# Patient Record
Sex: FEMALE | Race: ASIAN | NOT HISPANIC OR LATINO
[De-identification: names, ages, dates, MRNs, and addresses within clinical notes are randomized per-mention and may not be internally consistent; named-entity substitution may affect disease eponyms.]

---

## 2017-10-18 ENCOUNTER — TRANSCRIPTION ENCOUNTER (OUTPATIENT)
Age: 32
End: 2017-10-18

## 2018-02-21 ENCOUNTER — TRANSCRIPTION ENCOUNTER (OUTPATIENT)
Age: 33
End: 2018-02-21

## 2018-08-18 ENCOUNTER — TRANSCRIPTION ENCOUNTER (OUTPATIENT)
Age: 33
End: 2018-08-18

## 2018-10-27 ENCOUNTER — INPATIENT (INPATIENT)
Facility: HOSPITAL | Age: 33
LOS: 0 days | Discharge: HOME | DRG: 552 | End: 2018-10-28
Attending: NEUROLOGICAL SURGERY | Admitting: NEUROLOGICAL SURGERY
Payer: COMMERCIAL

## 2018-10-27 ENCOUNTER — TRANSCRIPTION ENCOUNTER (OUTPATIENT)
Age: 33
End: 2018-10-27

## 2018-10-27 VITALS
DIASTOLIC BLOOD PRESSURE: 65 MMHG | SYSTOLIC BLOOD PRESSURE: 99 MMHG | HEART RATE: 70 BPM | WEIGHT: 147.93 LBS | OXYGEN SATURATION: 99 % | HEIGHT: 64 IN | TEMPERATURE: 98 F | RESPIRATION RATE: 16 BRPM

## 2018-10-27 DIAGNOSIS — M54.30 SCIATICA, UNSPECIFIED SIDE: ICD-10-CM

## 2018-10-27 LAB
ALBUMIN SERPL ELPH-MCNC: 4.1 G/DL — SIGNIFICANT CHANGE UP (ref 3.3–5)
ALP SERPL-CCNC: 66 U/L — SIGNIFICANT CHANGE UP (ref 40–120)
ALT FLD-CCNC: 8 U/L — LOW (ref 10–45)
ANION GAP SERPL CALC-SCNC: 13 MMOL/L — SIGNIFICANT CHANGE UP (ref 5–17)
AST SERPL-CCNC: 8 U/L — LOW (ref 10–40)
BILIRUB SERPL-MCNC: 0.9 MG/DL — SIGNIFICANT CHANGE UP (ref 0.2–1.2)
BUN SERPL-MCNC: 19 MG/DL — SIGNIFICANT CHANGE UP (ref 7–23)
CALCIUM SERPL-MCNC: 9.5 MG/DL — SIGNIFICANT CHANGE UP (ref 8.4–10.5)
CHLORIDE SERPL-SCNC: 108 MMOL/L — SIGNIFICANT CHANGE UP (ref 96–108)
CO2 SERPL-SCNC: 19 MMOL/L — LOW (ref 22–31)
CREAT SERPL-MCNC: 0.63 MG/DL — SIGNIFICANT CHANGE UP (ref 0.5–1.3)
GLUCOSE SERPL-MCNC: 98 MG/DL — SIGNIFICANT CHANGE UP (ref 70–99)
HCT VFR BLD CALC: 39.9 % — SIGNIFICANT CHANGE UP (ref 34.5–45)
HGB BLD-MCNC: 12.9 G/DL — SIGNIFICANT CHANGE UP (ref 11.5–15.5)
MCHC RBC-ENTMCNC: 27.8 PG — SIGNIFICANT CHANGE UP (ref 27–34)
MCHC RBC-ENTMCNC: 32.3 GM/DL — SIGNIFICANT CHANGE UP (ref 32–36)
MCV RBC AUTO: 86.1 FL — SIGNIFICANT CHANGE UP (ref 80–100)
PLATELET # BLD AUTO: 251 K/UL — SIGNIFICANT CHANGE UP (ref 150–400)
POTASSIUM SERPL-MCNC: 4.1 MMOL/L — SIGNIFICANT CHANGE UP (ref 3.5–5.3)
POTASSIUM SERPL-SCNC: 4.1 MMOL/L — SIGNIFICANT CHANGE UP (ref 3.5–5.3)
PROT SERPL-MCNC: 7.5 G/DL — SIGNIFICANT CHANGE UP (ref 6–8.3)
RBC # BLD: 4.63 M/UL — SIGNIFICANT CHANGE UP (ref 3.8–5.2)
RBC # FLD: 13.1 % — SIGNIFICANT CHANGE UP (ref 10.3–14.5)
SODIUM SERPL-SCNC: 140 MMOL/L — SIGNIFICANT CHANGE UP (ref 135–145)
WBC # BLD: 21.3 K/UL — HIGH (ref 3.8–10.5)
WBC # FLD AUTO: 21.3 K/UL — HIGH (ref 3.8–10.5)

## 2018-10-27 PROCEDURE — 99284 EMERGENCY DEPT VISIT MOD MDM: CPT

## 2018-10-27 PROCEDURE — 72148 MRI LUMBAR SPINE W/O DYE: CPT | Mod: 26

## 2018-10-27 RX ORDER — SENNA PLUS 8.6 MG/1
2 TABLET ORAL AT BEDTIME
Refills: 0 | Status: DISCONTINUED | OUTPATIENT
Start: 2018-10-27 | End: 2018-10-28

## 2018-10-27 RX ORDER — OXYCODONE HYDROCHLORIDE 5 MG/1
10 TABLET ORAL EVERY 6 HOURS
Refills: 0 | Status: DISCONTINUED | OUTPATIENT
Start: 2018-10-27 | End: 2018-10-28

## 2018-10-27 RX ORDER — DOCUSATE SODIUM 100 MG
100 CAPSULE ORAL THREE TIMES A DAY
Refills: 0 | Status: DISCONTINUED | OUTPATIENT
Start: 2018-10-27 | End: 2018-10-28

## 2018-10-27 RX ORDER — LIDOCAINE 4 G/100G
1 CREAM TOPICAL ONCE
Refills: 0 | Status: COMPLETED | OUTPATIENT
Start: 2018-10-27 | End: 2018-10-27

## 2018-10-27 RX ORDER — OXYCODONE HYDROCHLORIDE 5 MG/1
5 TABLET ORAL EVERY 4 HOURS
Refills: 0 | Status: DISCONTINUED | OUTPATIENT
Start: 2018-10-27 | End: 2018-10-28

## 2018-10-27 RX ORDER — DEXAMETHASONE 0.5 MG/5ML
6 ELIXIR ORAL EVERY 8 HOURS
Refills: 0 | Status: DISCONTINUED | OUTPATIENT
Start: 2018-10-27 | End: 2018-10-28

## 2018-10-27 RX ORDER — ACETAMINOPHEN 500 MG
650 TABLET ORAL EVERY 6 HOURS
Refills: 0 | Status: DISCONTINUED | OUTPATIENT
Start: 2018-10-27 | End: 2018-10-28

## 2018-10-27 RX ORDER — DEXTROSE MONOHYDRATE, SODIUM CHLORIDE, AND POTASSIUM CHLORIDE 50; .745; 4.5 G/1000ML; G/1000ML; G/1000ML
1000 INJECTION, SOLUTION INTRAVENOUS
Refills: 0 | Status: DISCONTINUED | OUTPATIENT
Start: 2018-10-27 | End: 2018-10-27

## 2018-10-27 RX ORDER — SODIUM CHLORIDE 9 MG/ML
1000 INJECTION INTRAMUSCULAR; INTRAVENOUS; SUBCUTANEOUS
Refills: 0 | Status: DISCONTINUED | OUTPATIENT
Start: 2018-10-27 | End: 2018-10-27

## 2018-10-27 RX ORDER — HYDROMORPHONE HYDROCHLORIDE 2 MG/ML
0.5 INJECTION INTRAMUSCULAR; INTRAVENOUS; SUBCUTANEOUS EVERY 4 HOURS
Refills: 0 | Status: DISCONTINUED | OUTPATIENT
Start: 2018-10-27 | End: 2018-10-28

## 2018-10-27 RX ORDER — ONDANSETRON 8 MG/1
4 TABLET, FILM COATED ORAL EVERY 6 HOURS
Refills: 0 | Status: DISCONTINUED | OUTPATIENT
Start: 2018-10-27 | End: 2018-10-28

## 2018-10-27 RX ORDER — MORPHINE SULFATE 50 MG/1
4 CAPSULE, EXTENDED RELEASE ORAL ONCE
Refills: 0 | Status: DISCONTINUED | OUTPATIENT
Start: 2018-10-27 | End: 2018-10-27

## 2018-10-27 RX ADMIN — Medication 6 MILLIGRAM(S): at 22:15

## 2018-10-27 RX ADMIN — MORPHINE SULFATE 4 MILLIGRAM(S): 50 CAPSULE, EXTENDED RELEASE ORAL at 15:05

## 2018-10-27 RX ADMIN — SODIUM CHLORIDE 100 MILLILITER(S): 9 INJECTION INTRAMUSCULAR; INTRAVENOUS; SUBCUTANEOUS at 15:05

## 2018-10-27 RX ADMIN — OXYCODONE HYDROCHLORIDE 10 MILLIGRAM(S): 5 TABLET ORAL at 22:40

## 2018-10-27 RX ADMIN — Medication 100 MILLIGRAM(S): at 22:10

## 2018-10-27 RX ADMIN — OXYCODONE HYDROCHLORIDE 10 MILLIGRAM(S): 5 TABLET ORAL at 22:10

## 2018-10-27 RX ADMIN — LIDOCAINE 1 PATCH: 4 CREAM TOPICAL at 15:05

## 2018-10-27 NOTE — ED PROVIDER NOTE - OBJECTIVE STATEMENT
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 Pt comes to ed complains of back pain past two days. Seen at [facility] ED treated with Dilaudid. Ct showed disc narrowing L5-S1 ,with disc protrusion and bulge at L4-5. Pt had followed up Ortho [facility] Treated with trigger point injection and steroids referred for MRI, Seen at Urgent care. Pain. left back rad to leg, No bowel/bladder changes. no fever chills. shortness of breath ,chest pain.

## 2018-10-27 NOTE — ED ADULT NURSE NOTE - CHIEF COMPLAINT QUOTE
nontraumatic back pain since Thursday morning seen in [facility] ED, CT scan shows herniation in L5, scheduled to have MRI but unable to have MRI done over the weekend and continuing to have pain

## 2018-10-27 NOTE — PATIENT PROFILE ADULT - NSPROMUTANXFEARFT_GEN_A_NUR
[place]7 [place]  to enter with a handrail.  Uses a cane for ambulation but poor historian for other levels of function.
no

## 2018-10-27 NOTE — H&P ADULT - ASSESSMENT
33 yr old F h/o renal stones p/w severe (10/10) low back pain radiating to left leg anteriorly all the way to the toes for past two days. Seen at [facility] ED treated with Dilaudid and got a CT scan that showed disc narrowing L5-S1,with disc protrusion and annual bulge at L4-5. Pt had followed up Ortho [facility] yesterday and was treated with trigger point injection and steroids referred for MRI, which she still did not undergo yet.    Plan:   - Admit to neurosurgery  - Pain control  - Decadron 6q8  - MRI L spine   - Please request CT scan images from [facility]

## 2018-10-27 NOTE — CONSULT NOTE ADULT - SUBJECTIVE AND OBJECTIVE BOX
p ([id number])     HPI: 33 yr old F h/o renal stones p/w severe (10/10) low back pain radiating to left leg anteriorly all the way to the toes for past two days. Seen at [facility] ED treated with Dilaudid and got a CT scan that showed disc narrowing L5-S1,with disc protrusion and annual bulge at L4-5. Pt had followed up Ortho [facility] yesterday and was treated with trigger point injection and steroids referred for MRI, which she still did not have. Patient denies HA, visual changes, hearing changes, nausea, vomiting, saddle anesthesia, and urinary and bowel incontinence.     PAST MEDICAL HISTORY   Renal colic    PAST SURGICAL HISTORY         MEDICATIONS:  Antibiotics:    Neuro:    Anticoagulation:    Other:  sodium chloride 0.9%. 1000 milliLiter(s) IV Continuous <Continuous>      SOCIAL HISTORY:   Occupation:   Marital Status:     FAMILY HISTORY:      PHYSICAL EXAMINATION:   T(C): 36.8 (10-27-18 @ 14:36), Max: 36.9 (10-27-18 @ 13:37)  HR: 57 (10-27-18 @ 14:36) (57 - 70)  BP: 102/68 (10-27-18 @ 14:36) (99/65 - 102/68)  RR: 18 (10-27-18 @ 14:36) (16 - 18)  SpO2: 99% (10-27-18 @ 14:36) (99% - 99%)  Wt(kg): --Height (cm): 162.56 (10-27 @ 13:37)  Weight (kg): 67.1 (10-27 @ 13:37)    General Examination:     PHYSICAL EXAM:    Constitutional: No Acute Distress     Neurological: AOx3, Following Commands  Pain limited exam in the BL LE.   Motor exam:          Upper extremity                         Delt     Bicep     Tricep    HG                                                 R         5/5        5/5        5/5       5/5                                               L          5/5        5/5        5/5       5/5          Lower extremity                        HF         KF        KE       DF         PF                                                  R        4+/5        5/5        5/5       5/5         5/5                                               L         4+/5        5/5       5/5       5/5          5/5                                                 Sensation: [X] intact to light touch  [] decreased:   SLR+ and cross SLR+  No clonus or delvalle's      LABS:                        12.9   21.3  )-----------( 251      ( 27 Oct 2018 15:19 )             39.9                 RADIOLOGY & ADDITIONAL STUDIES: p ([id number])     HPI: 33 yr old F h/o renal stones p/w severe (10/10) low back pain radiating to left leg anteriorly all the way to the toes for past two days. Seen at [facility] ED treated with Dilaudid and got a CT scan that showed disc narrowing L5-S1,with disc protrusion and annual bulge at L4-5. Pt had followed up Ortho [facility] yesterday and was treated with trigger point injection and steroids referred for MRI, which she still did not have. Patient denies HA, visual changes, hearing changes, nausea, vomiting, saddle anesthesia, and urinary and bowel incontinence.     PAST MEDICAL HISTORY   Renal colic    PAST SURGICAL HISTORY         MEDICATIONS:  Antibiotics:    Neuro:    Anticoagulation:    Other:  sodium chloride 0.9%. 1000 milliLiter(s) IV Continuous <Continuous>      SOCIAL HISTORY:   Occupation:   Marital Status:     FAMILY HISTORY:      PHYSICAL EXAMINATION:   T(C): 36.8 (10-27-18 @ 14:36), Max: 36.9 (10-27-18 @ 13:37)  HR: 57 (10-27-18 @ 14:36) (57 - 70)  BP: 102/68 (10-27-18 @ 14:36) (99/65 - 102/68)  RR: 18 (10-27-18 @ 14:36) (16 - 18)  SpO2: 99% (10-27-18 @ 14:36) (99% - 99%)  Wt(kg): --Height (cm): 162.56 (10-27 @ 13:37)  Weight (kg): 67.1 (10-27 @ 13:37)    General Examination:     PHYSICAL EXAM:    Constitutional: No Acute Distress     Neurological: AOx3, Following Commands  Pain limited exam in the BL LE.   Motor exam:          Upper extremity                         Delt     Bicep     Tricep    HG                                                 R         5/5        5/5        5/5       5/5                                               L          5/5        5/5        5/5       5/5          Lower extremity                        HF         KF        KE       DF         PF                                                  R        4+/5        5/5        5/5       5/5         5/5                                               L         4+/5        5/5       5/5       4+/5          5/5                                                 Sensation: [X] intact to light touch  [] decreased:   SLR+ and cross SLR+  No clonus or delvalle's      LABS:                        12.9   21.3  )-----------( 251      ( 27 Oct 2018 15:19 )             39.9                 RADIOLOGY & ADDITIONAL STUDIES:

## 2018-10-27 NOTE — ED ADULT NURSE NOTE - INTERVENTIONS DEFINITIONS
Non-slip footwear when patient is off stretcher/Physically safe environment: no spills, clutter or unnecessary equipment

## 2018-10-27 NOTE — ED PROVIDER NOTE - PROGRESS NOTE DETAILS
Pt's evaluated by spine and recommended CDU or ADM for pain management and Lumbar MRI without contrast. Pt wanted to discuss with her  about hospitalization. Pt's evaluated by CDU PA but she is not ambulate independently. Pt's evaluated by Dr. Mckeon and recommended adm to his service. Pt's evaluated by spine and recommended CDU or ADM for pain management and Lumbar MRI without contrast. Pt stated she has outpt MRI appointment on Monday and requested MRI this weekend. Otherwise she wanted to f/u her out pt. MRI schedule. + Spoked to neurosurgery and recommended again hospitalization for pain management and MRI in pt. Explained pt the necessary of adm, but she didn't decided it. Pt's evaluated by CDU PA but she is not ambulate independently. Pt wanted to discuss with her  about hospitalization.

## 2018-10-27 NOTE — ED CLERICAL - NS ED CLERK NOTE PRE-ARRIVAL INFORMATION; ADDITIONAL PRE-ARRIVAL INFORMATION
CC/Reason For referral: Back pain, hip pain  Preferred Consultant(if applicable): N/A  Who admits for you (if needed): N/A  Do you have documents you would like to fax over? No  Would you still like to speak to an ED attending? No

## 2018-10-27 NOTE — ED ADULT TRIAGE NOTE - CHIEF COMPLAINT QUOTE
nontraumatic back pain since Thursday morning seen in [facility] ED, CT scan shows herniation in L5, scheduled to have MRI but unable to have MRI done over the weekend and continuing to have pain

## 2018-10-27 NOTE — ED ADULT NURSE NOTE - OBJECTIVE STATEMENT
34 y/o F, A&Ox4, enters ED w/ c/o back pain. Hx. of kidney stones. Pt. reports pain started on Thursday and was in the lower center back, but pt. reports pain is now on L. lower back. Pt. reports it radiates to L. groin and L. leg. Pt. 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 No numbness/tingling. Pt. able to move all extremities, however, w/ increased pain. Pupils 3 mm in size, PERRL. Pt. reports she is unable to ambulate due to the pain and has tried Percocet and Toradol w/ no relief (received IM Toradol 30 min. prior to arrival to ED). Pt. unable to sit up w/o difficulty due to pain. No dysuria/hematuria. Negative CVA tenderness. No abdominal pain. Abdomen soft, round, nontender. No fever/chills. No chest pain. No SOB. LMP 9/29. Skin warm, dry and intact. Safety and comfort provided. Family at bedside. 34 y/o F, A&Ox4, enters ED w/ c/o back pain. Hx. of kidney stones. Pt. reports pain started on Thursday and was in the lower center back, but pt. reports pain is now on L. lower back. Pt. had a CT scan done at [facility] and found to have a herniation of L5 and was scheduled to have an MRI, but was unable to have MRI this weekend. Pt. reports it radiates to L. groin and L. leg. Pt. 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 No numbness/tingling. Pt. able to move all extremities, however, w/ increased pain. Pupils 3 mm in size, PERRL. Pt. reports she is unable to ambulate due to the pain and has tried Percocet and Toradol w/ no relief (received IM Toradol 30 min. prior to arrival to ED). Pt. unable to sit up w/o difficulty due to pain. No dysuria/hematuria. Negative CVA tenderness. No abdominal pain. Abdomen soft, round, nontender. No fever/chills. No chest pain. No SOB. LMP 9/29. Skin warm, dry and intact. Safety and comfort provided. Family at bedside. 32 y/o F, A&Ox4, enters ED w/ c/o back pain. Hx. of kidney stones. Pt. reports pain started on Thursday and was in the lower center back, but pt. reports pain is now on L. lower back. Pt. had a CT scan done at [facility] and found to have a herniation of L5 and was scheduled to have an MRI, but was unable to have MRI this weekend. Pt. reports it radiates to L. groin and L. leg. Pt. 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 No numbness/tingling. Pt. able to move all extremities, however, w/ increased pain. Pupils 3 mm in size, PERRL. Pt. reports she is unable to ambulate due to the pain and has tried Percocet and Toradol w/ no relief (received IM Toradol 30 min. prior to arrival to ED). Pt. unable to sit up w/o difficulty due to pain. No dysuria/hematuria. Negative CVA tenderness. No abdominal pain. Abdomen soft, round, nontender. No fever/chills. No chest pain. No SOB. LMP 9/29. Neuro flow sheet in chart, dysphagia screen done. Skin warm, dry and intact. Safety and comfort provided. Family at bedside.

## 2018-10-27 NOTE — H&P ADULT - NSHPLABSRESULTS_GEN_ALL_CORE
LABS:                        12.9   21.3  )-----------( 251      ( 27 Oct 2018 15:19 )             39.9     10-27    140  |  108  |  19  ----------------------------<  98  4.1   |  19<L>  |  0.63    Ca    9.5      27 Oct 2018 15:19    TPro  7.5  /  Alb  4.1  /  TBili  0.9  /  DBili  x   /  AST  8<L>  /  ALT  8<L>  /  AlkPhos  66  10-27

## 2018-10-27 NOTE — H&P ADULT - HISTORY OF PRESENT ILLNESS
33 yr old F h/o renal stones p/w severe (10/10) low back pain radiating to left leg anteriorly all the way to the toes for past two days. Seen at [facility] ED treated with Dilaudid and got a CT scan that showed disc narrowing L5-S1,with disc protrusion and annual bulge at L4-5. Pt had followed up Ortho [facility] yesterday and was treated with trigger point injection and steroids referred for MRI, which she still did not have. Patient denies HA, visual changes, hearing changes, nausea, vomiting, saddle anesthesia, and urinary and bowel incontinence.

## 2018-10-27 NOTE — CONSULT NOTE ADULT - ASSESSMENT
33 yr old F h/o renal stones p/w severe (10/10) low back pain radiating to left leg anteriorly all the way to the toes for past two days. Seen at [facility] ED treated with Dilaudid and got a CT scan that showed disc narrowing L5-S1,with disc protrusion and annual bulge at L4-5. Pt had followed up Ortho [facility] yesterday and was treated with trigger point injection and steroids referred for MRI, which she still did not undergo yet.    Plan:   - Pain control 33 yr old F h/o renal stones p/w severe (10/10) low back pain radiating to left leg anteriorly all the way to the toes for past two days. Seen at [facility] ED treated with Dilaudid and got a CT scan that showed disc narrowing L5-S1,with disc protrusion and annual bulge at L4-5. Pt had followed up Ortho [facility] yesterday and was treated with trigger point injection and steroids referred for MRI, which she still did not undergo yet.    Plan:   - Medicine for pain control 33 yr old F h/o renal stones p/w severe (10/10) low back pain radiating to left leg anteriorly all the way to the toes for past two days. Seen at [facility] ED treated with Dilaudid and got a CT scan that showed disc narrowing L5-S1,with disc protrusion and annual bulge at L4-5. Pt had followed up Ortho [facility] yesterday and was treated with trigger point injection and steroids referred for MRI, which she still did not undergo yet.    Plan:   - Medicine for pain control  - MRI L spine   - Please request CT scan images from [facility] 33 yr old F h/o renal stones p/w severe (10/10) low back pain radiating to left leg anteriorly all the way to the toes for past two days. Seen at [facility] ED treated with Dilaudid and got a CT scan that showed disc narrowing L5-S1,with disc protrusion and annual bulge at L4-5. Pt had followed up Ortho [facility] yesterday and was treated with trigger point injection and steroids referred for MRI, which she still did not undergo yet.    Plan:   - Admit to neurosurgery  - Pain control  - Decadron 6q8  - MRI L spine   - Please request CT scan images from [facility]

## 2018-10-28 ENCOUNTER — TRANSCRIPTION ENCOUNTER (OUTPATIENT)
Age: 33
End: 2018-10-28

## 2018-10-28 VITALS
HEART RATE: 56 BPM | DIASTOLIC BLOOD PRESSURE: 73 MMHG | SYSTOLIC BLOOD PRESSURE: 117 MMHG | RESPIRATION RATE: 18 BRPM | OXYGEN SATURATION: 98 % | TEMPERATURE: 98 F

## 2018-10-28 DIAGNOSIS — M54.30 SCIATICA, UNSPECIFIED SIDE: ICD-10-CM

## 2018-10-28 LAB
APPEARANCE UR: CLEAR — SIGNIFICANT CHANGE UP
BACTERIA # UR AUTO: ABNORMAL
BILIRUB UR-MCNC: NEGATIVE — SIGNIFICANT CHANGE UP
COLOR SPEC: SIGNIFICANT CHANGE UP
DIFF PNL FLD: NEGATIVE — SIGNIFICANT CHANGE UP
EPI CELLS # UR: 3 /HPF — SIGNIFICANT CHANGE UP
GLUCOSE UR QL: NEGATIVE — SIGNIFICANT CHANGE UP
HYALINE CASTS # UR AUTO: 2 /LPF — SIGNIFICANT CHANGE UP (ref 0–2)
KETONES UR-MCNC: NEGATIVE — SIGNIFICANT CHANGE UP
LEUKOCYTE ESTERASE UR-ACNC: ABNORMAL
NITRITE UR-MCNC: NEGATIVE — SIGNIFICANT CHANGE UP
PH UR: 6 — SIGNIFICANT CHANGE UP (ref 5–8)
PROT UR-MCNC: SIGNIFICANT CHANGE UP
RBC CASTS # UR COMP ASSIST: 2 /HPF — SIGNIFICANT CHANGE UP (ref 0–4)
SP GR SPEC: 1.03 — HIGH (ref 1.01–1.02)
UROBILINOGEN FLD QL: NEGATIVE — SIGNIFICANT CHANGE UP
WBC UR QL: 105 /HPF — HIGH (ref 0–5)

## 2018-10-28 PROCEDURE — 99239 HOSP IP/OBS DSCHRG MGMT >30: CPT

## 2018-10-28 RX ORDER — LIDOCAINE 4 G/100G
1 CREAM TOPICAL
Qty: 7 | Refills: 0
Start: 2018-10-28 | End: 2018-11-03

## 2018-10-28 RX ORDER — OXYCODONE HYDROCHLORIDE 5 MG/1
10 TABLET ORAL EVERY 4 HOURS
Refills: 0 | Status: DISCONTINUED | OUTPATIENT
Start: 2018-10-28 | End: 2018-10-28

## 2018-10-28 RX ORDER — LIDOCAINE 4 G/100G
1 CREAM TOPICAL EVERY 24 HOURS
Refills: 0 | Status: DISCONTINUED | OUTPATIENT
Start: 2018-10-28 | End: 2018-10-28

## 2018-10-28 RX ORDER — CYCLOBENZAPRINE HYDROCHLORIDE 10 MG/1
10 TABLET, FILM COATED ORAL THREE TIMES A DAY
Refills: 0 | Status: DISCONTINUED | OUTPATIENT
Start: 2018-10-28 | End: 2018-10-28

## 2018-10-28 RX ORDER — DOCUSATE SODIUM 100 MG
1 CAPSULE ORAL
Qty: 0 | Refills: 0 | DISCHARGE
Start: 2018-10-28

## 2018-10-28 RX ORDER — ACETAMINOPHEN 500 MG
2 TABLET ORAL
Qty: 0 | Refills: 0 | DISCHARGE
Start: 2018-10-28

## 2018-10-28 RX ORDER — OXYCODONE HYDROCHLORIDE 5 MG/1
1 TABLET ORAL
Qty: 42 | Refills: 0
Start: 2018-10-28 | End: 2018-11-03

## 2018-10-28 RX ORDER — SENNA PLUS 8.6 MG/1
2 TABLET ORAL
Qty: 0 | Refills: 0 | DISCHARGE
Start: 2018-10-28

## 2018-10-28 RX ADMIN — OXYCODONE HYDROCHLORIDE 10 MILLIGRAM(S): 5 TABLET ORAL at 06:15

## 2018-10-28 RX ADMIN — OXYCODONE HYDROCHLORIDE 10 MILLIGRAM(S): 5 TABLET ORAL at 10:20

## 2018-10-28 RX ADMIN — Medication 6 MILLIGRAM(S): at 06:14

## 2018-10-28 RX ADMIN — Medication 100 MILLIGRAM(S): at 06:14

## 2018-10-28 RX ADMIN — OXYCODONE HYDROCHLORIDE 10 MILLIGRAM(S): 5 TABLET ORAL at 10:50

## 2018-10-28 RX ADMIN — OXYCODONE HYDROCHLORIDE 10 MILLIGRAM(S): 5 TABLET ORAL at 06:45

## 2018-10-28 RX ADMIN — LIDOCAINE 1 PATCH: 4 CREAM TOPICAL at 03:46

## 2018-10-28 NOTE — DISCHARGE NOTE ADULT - CARE PLAN
Principal Discharge DX:	Back pain with sciatica  Goal:	strengthening, pain control  Assessment and plan of treatment:	Call Neurosurgery Dr ERMA Mckeon for appointment in 1 week.  Followup with your Private MD in 1-2 weeks.  Return to Emergency Department or contact your Neurosurgeon if any changes in mental status, weakness, numbness or tingling of extremities; difficulty swallowing; drainage or redness of wound, fever; pain in legs; difficulty urinating or constipation.  Assessment and plan of treatment:	No strenous activity. No heavy lifting. Do not return to work until cleared by physician. No driving until cleared by physician.

## 2018-10-28 NOTE — DISCHARGE NOTE ADULT - REASON FOR ADMISSION
Severe low back pain radiating to left leg.  33 yr old F h/o renal stones p/w severe (10/10) low back pain radiating to left leg anteriorly all the way to the toes for past two days. Seen at [facility] ED treated with Dilaudid and got a CT scan that showed disc narrowing L5-S1,with disc protrusion and annual bulge at L4-5. Pt had followed up Ortho [facility] yesterday and was treated with trigger point injection and steroids referred for MRI, which she still did not have. Patient denies HA, visual changes, hearing changes, nausea, vomiting, saddle anesthesia, and urinary and bowel incontinence.

## 2018-10-28 NOTE — DISCHARGE NOTE ADULT - MEDICATION SUMMARY - MEDICATIONS TO TAKE
I will START or STAY ON the medications listed below when I get home from the hospital:    Physical therapy  -- L5-S1 disc herniation    PT Eval and Treatment  -- Indication: For Strengthening    Medrol 4 mg oral tablet  -- Medrol Dose Pkg  Take as directed  Disp: 1 pkg  -- It is very important that you take or use this exactly as directed.  Do not skip doses or discontinue unless directed by your doctor.  Obtain medical advice before taking any non-prescription drugs as some may affect the action of this medication.  Take with food or milk.    -- Indication: For Back apin    acetaminophen 325 mg oral tablet  -- 2 tab(s) by mouth every 6 hours, As needed, Temp greater or equal to 38C (100.4F), Mild Pain (1 - 3)  -- Indication: For fever    oxyCODONE 5 mg oral tablet  -- 1 to 2 tab(s) by mouth every 4 hours, As needed, Moderate to severe Pain (4 - 6) MDD:6 tabs  -- Indication: For moderate to severe pain    lidocaine 5% topical film  -- Apply on skin to affected area once a day  -- Indication: For pain    docusate sodium 100 mg oral capsule  -- 1 cap(s) by mouth 3 times a day  -- Indication: For laxative    senna oral tablet  -- 2 tab(s) by mouth once a day (at bedtime), As needed, Constipation  -- Indication: For constipation    methocarbamol 500 mg oral tablet  -- 2 tab(s) by mouth 2 or 3 times a day  -- Indication: For Muscle relaxer

## 2018-10-28 NOTE — DISCHARGE NOTE ADULT - PATIENT PORTAL LINK FT
You can access the [facility] Patient Portal, offered by [facility], by registering with the following website: [email or web address]

## 2018-10-28 NOTE — DISCHARGE NOTE ADULT - MEDICATION SUMMARY - MEDICATIONS TO STOP TAKING
I will STOP taking the medications listed below when I get home from the hospital:    Medrol Dosepak 4 mg oral tablet  -- started 10/26/18  took every dose on 10/26/18  took morning dose on 10/27/18  last dose taken on 10/27/18    Percocet 5/325 oral tablet  -- 1 tab(s) by mouth every 6 hours, As Needed    Advil 200 mg oral tablet  -- 1 tab(s) by mouth , As Needed

## 2018-10-28 NOTE — DISCHARGE NOTE ADULT - CARE PROVIDER_API CALL
Catherine Mckeon (DO), Neurological Surgery  [address]  [place] [address]  Phone: [phone]  Fax: [phone]

## 2018-10-28 NOTE — DISCHARGE NOTE ADULT - HOSPITAL COURSE
The patient was admitted via ED on 10/27/2018.  She underwent an MRI of L-spine and was on Decadron and pain controlled.  No surgery was done during this admission.     PROCEDURE:  Adm 10/27 L5-S1 disc, non surgical    Neuro: Pain Control, Outpt PT/Conservative mgt.  Inc activity/OOB. Medrol Dose Pkg on discharge today.     Respiratory: Patient instructed to use incentive spirometer [ ] YES [ X] NO              DVT ppx: [ ] SQL [ ] SQH and Venodynes [ ] Left [ ] Right [ X] Bilateral    Discharge Planning:  The patient was amb independently and no PT eval was needed.   She was subsequently DC on 10/28/2018 in stable condition.

## 2018-10-28 NOTE — PROGRESS NOTE ADULT - SUBJECTIVE AND OBJECTIVE BOX
SUBJECTIVE: Appears well, but still with LBP w/radiation LLE/ant thigh    OVERNIGHT EVENTS: None    Vital Signs Last 24 Hrs  T(C): 36.6 (28 Oct 2018 08:39), Max: 36.9 (27 Oct 2018 13:37)  T(F): 97.8 (28 Oct 2018 08:39), Max: 98.4 (27 Oct 2018 13:37)  HR: 56 (28 Oct 2018 08:39) (51 - 70)  BP: 117/73 (28 Oct 2018 08:39) (94/60 - 117/73)  BP(mean): --  RR: 18 (28 Oct 2018 08:39) (16 - 18)  SpO2: 98% (28 Oct 2018 08:39) (97% - 100%)  IVF: [ X] IVL [ ] NS+K@   DIET: [X ] Regular [ ] CCD [ ] Renal [ ] Puree [ ] Dysphagia [ ] Tube Feeds:   PCA: [ ] YES [ X] NO   YOUSIF: [ ] YES [X ] NO [X ] VOID   BM: [ ] YES [X ] NO     DRAINS: N/A    PHYSICAL EXAM:    Constitutional: No Acute Distress     Neurological: AOx3, Following Commands, Moving all Extremities     Motor exam:          Upper extremity                         Delt     Bicep     Tricep    HG                                                 R         5/5        5/5        5/5       5/5                                               L          5/5        5/5        5/5       5/5          Lower extremity                        HF         KF        KE       DF         PF                                                  R        4+/5        5/5     5/5       5/5       5/5                                               L         4+/5        5/5     5/5      4+/5      5/5                                                 Sensation: [X] intact to light touch  [] decreased:     Pulmonary: Clear to Auscultation, No rales, No rhonchi, No wheezes     Cardiovascular: S1, S2, Regular rate and rhythm     Gastrointestinal: Soft, Non-tender, Non-distended     Extremities: No calf tenderness     Incision: N/A    LABS:                        12.9   21.3  )-----------( 251      ( 27 Oct 2018 15:19 )             39.9    10-27    140  |  108  |  19  ----------------------------<  98  4.1   |  19<L>  |  0.63    Ca    9.5      27 Oct 2018 15:19    TPro  7.5  /  Alb  4.1  /  TBili  0.9  /  DBili  x   /  AST  8<L>  /  ALT  8<L>  /  AlkPhos  66  10-27    IMAGING:  < from: MR Lumbar Spine No Cont (10.27.18 @ 21:16) >  L5-S1 central disc herniation effaces the ventral aspect of the thecal   sac and just touches the S1 nerve roots.    MEDICATIONS  (STANDING):  dexamethasone     Tablet 6 milliGRAM(s) Oral every 8 hours  docusate sodium 100 milliGRAM(s) Oral three times a day  lidocaine   Patch 1 Patch Transdermal every 24 hours    MEDICATIONS  (PRN):  acetaminophen   Tablet .. 650 milliGRAM(s) Oral every 6 hours PRN Temp greater or equal to 38C (100.4F), Mild Pain (1 - 3)  cyclobenzaprine 10 milliGRAM(s) Oral three times a day PRN Muscle Spasm  HYDROmorphone  Injectable 0.5 milliGRAM(s) IV Push every 4 hours PRN breakthru  ondansetron   Disintegrating Tablet 4 milliGRAM(s) Oral every 6 hours PRN Nausea  oxyCODONE    IR 10 milliGRAM(s) Oral every 4 hours PRN Severe Pain (7 - 10)  oxyCODONE    IR 5 milliGRAM(s) Oral every 4 hours PRN Moderate Pain (4 - 6)  senna 2 Tablet(s) Oral at bedtime PRN Constipation

## 2018-10-28 NOTE — PROGRESS NOTE ADULT - ASSESSMENT
33 yr old F h/o renal stones p/w severe (10/10) low back pain radiating to left leg anteriorly all the way to the toes for past two days. Seen at [facility] ED treated with Dilaudid and got a CT scan that showed disc narrowing L5-S1,with disc protrusion and annual bulge at L4-5. Pt had followed up Ortho [facility] yesterday and was treated with trigger point injection and steroids referred for MRI, which she still did not have. Patient denies HA, visual changes, hearing changes, nausea, vomiting, saddle anesthesia, and urinary and bowel incontinence. (27 Oct 2018 18:30)    PROCEDURE:  Adm 10/27 L5-S1 disc  POD#N/A    PLAN:  Neuro: Pain Control, Outpt PT/Conservative mgt.  Inc activity/OOB. Medrol Dose Pkg on discharge today.     Respiratory: Patient instructed to use incentive spirometer [ ] YES [ X] NO              DVT ppx: [ ] SQL [ ] SQH and Venodynes [ ] Left [ ] Right [ X] Bilateral    Discharge Planning:  The patient was amb independently and no PT eval was needed.   She was subsequently DC on 10/28/2018 in stable condition.    More than 30 minutes spent on total encounter: more than 50% of the visit was spent on educating the patient and family regarding condition, medications, follow up plans, signs and symptoms to be concerned with, preparing paperwork, and questions answered regarding discharge.      Assessment:  Please Check When Present   []  GCS  E   V  M     Heart Failure: []Acute, [] acute on chronic , []chronic  Heart Failure:  [] Diastolic (HFpEF), [] Systolic (HFrEF), []Combined (HFpEF and HFrEF), [] RHF, [] Pulm HTN, [] Other    [] SHERIN, [] ATN, [] AIN, [] other  [] CKD1, [] CKD2, [] CKD 3, [] CKD 4, [] CKD 5, []ESRD    Encephalopathy: [] Metabolic, [] Hepatic, [] toxic, [] Neurological, [] Other    Abnormal Nurtitional Status: [] malnurtition (see nutrition note), [ ]underweight: BMI < 19, [] morbid obesity: BMI >40, [] Cachexia    [] Sepsis  [] hypovolemic shock,[] cardiogenic shock, [] hemorrhagic shock, [] neuogenic shock  [] Acute Respiratory Failure  []Cerebral edema, [] Brain compression/ herniation,   [] Functional quadriplegia  [] Acute blood loss anemia

## 2018-10-28 NOTE — DISCHARGE NOTE ADULT - NS AS ACTIVITY OBS
No Heavy lifting/straining/Do not drive or operate machinery/Bathing allowed/Showering allowed/Walking-Indoors allowed/Walking-Outdoors allowed/Stairs allowed

## 2018-10-28 NOTE — DISCHARGE NOTE ADULT - PLAN OF CARE
strengthening, pain control Call Neurosurgery Dr ERMA Mckeon for appointment in 1 week.  Followup with your Private MD in 1-2 weeks.  Return to Emergency Department or contact your Neurosurgeon if any changes in mental status, weakness, numbness or tingling of extremities; difficulty swallowing; drainage or redness of wound, fever; pain in legs; difficulty urinating or constipation. No strenous activity. No heavy lifting. Do not return to work until cleared by physician. No driving until cleared by physician.

## 2018-10-29 PROBLEM — Z00.00 ENCOUNTER FOR PREVENTIVE HEALTH EXAMINATION: Noted: 2018-10-29

## 2018-10-31 ENCOUNTER — APPOINTMENT (OUTPATIENT)
Dept: SPINE | Facility: CLINIC | Age: 33
End: 2018-10-31
Payer: COMMERCIAL

## 2018-10-31 VITALS
DIASTOLIC BLOOD PRESSURE: 70 MMHG | SYSTOLIC BLOOD PRESSURE: 116 MMHG | BODY MASS INDEX: 25.27 KG/M2 | WEIGHT: 148 LBS | HEIGHT: 64 IN

## 2018-10-31 DIAGNOSIS — Z78.9 OTHER SPECIFIED HEALTH STATUS: ICD-10-CM

## 2018-10-31 DIAGNOSIS — M54.5 LOW BACK PAIN: ICD-10-CM

## 2018-10-31 DIAGNOSIS — Z87.442 PERSONAL HISTORY OF URINARY CALCULI: ICD-10-CM

## 2018-10-31 PROCEDURE — 99213 OFFICE O/P EST LOW 20 MIN: CPT

## 2018-11-01 PROBLEM — Z87.442 HISTORY OF RENAL CALCULI: Status: RESOLVED | Noted: 2018-11-01 | Resolved: 2018-11-01

## 2018-11-01 PROBLEM — M54.5 LOWER BACK PAIN: Status: ACTIVE | Noted: 2018-11-01

## 2018-11-01 PROBLEM — Z78.9 NON-SMOKER: Status: ACTIVE | Noted: 2018-11-01

## 2018-11-19 ENCOUNTER — EMERGENCY (EMERGENCY)
Facility: HOSPITAL | Age: 33
LOS: 1 days | Discharge: HOME | End: 2018-11-19
Attending: EMERGENCY MEDICINE
Payer: COMMERCIAL

## 2018-11-19 ENCOUNTER — TRANSCRIPTION ENCOUNTER (OUTPATIENT)
Age: 33
End: 2018-11-19

## 2018-11-19 VITALS
WEIGHT: 141.98 LBS | SYSTOLIC BLOOD PRESSURE: 122 MMHG | HEIGHT: 64 IN | OXYGEN SATURATION: 98 % | DIASTOLIC BLOOD PRESSURE: 83 MMHG | HEART RATE: 82 BPM | RESPIRATION RATE: 16 BRPM | TEMPERATURE: 98 F

## 2018-11-19 VITALS
SYSTOLIC BLOOD PRESSURE: 118 MMHG | OXYGEN SATURATION: 98 % | HEART RATE: 97 BPM | DIASTOLIC BLOOD PRESSURE: 78 MMHG | RESPIRATION RATE: 14 BRPM

## 2018-11-19 LAB
ALBUMIN SERPL ELPH-MCNC: 4.5 G/DL — SIGNIFICANT CHANGE UP (ref 3.3–5)
ALP SERPL-CCNC: 77 U/L — SIGNIFICANT CHANGE UP (ref 40–120)
ALT FLD-CCNC: 18 U/L — SIGNIFICANT CHANGE UP (ref 10–45)
ANION GAP SERPL CALC-SCNC: 13 MMOL/L — SIGNIFICANT CHANGE UP (ref 5–17)
APPEARANCE UR: ABNORMAL
AST SERPL-CCNC: 16 U/L — SIGNIFICANT CHANGE UP (ref 10–40)
BACTERIA # UR AUTO: NEGATIVE — SIGNIFICANT CHANGE UP
BASOPHILS # BLD AUTO: 0 K/UL — SIGNIFICANT CHANGE UP (ref 0–0.2)
BASOPHILS NFR BLD AUTO: 0.2 % — SIGNIFICANT CHANGE UP (ref 0–2)
BILIRUB SERPL-MCNC: 0.5 MG/DL — SIGNIFICANT CHANGE UP (ref 0.2–1.2)
BILIRUB UR-MCNC: NEGATIVE — SIGNIFICANT CHANGE UP
BUN SERPL-MCNC: 20 MG/DL — SIGNIFICANT CHANGE UP (ref 7–23)
CALCIUM SERPL-MCNC: 9.3 MG/DL — SIGNIFICANT CHANGE UP (ref 8.4–10.5)
CHLORIDE SERPL-SCNC: 105 MMOL/L — SIGNIFICANT CHANGE UP (ref 96–108)
CO2 SERPL-SCNC: 23 MMOL/L — SIGNIFICANT CHANGE UP (ref 22–31)
COLOR SPEC: YELLOW — SIGNIFICANT CHANGE UP
CREAT SERPL-MCNC: 0.76 MG/DL — SIGNIFICANT CHANGE UP (ref 0.5–1.3)
DIFF PNL FLD: NEGATIVE — SIGNIFICANT CHANGE UP
EOSINOPHIL # BLD AUTO: 0.1 K/UL — SIGNIFICANT CHANGE UP (ref 0–0.5)
EOSINOPHIL NFR BLD AUTO: 0.9 % — SIGNIFICANT CHANGE UP (ref 0–6)
EPI CELLS # UR: 4 /HPF — SIGNIFICANT CHANGE UP
GLUCOSE SERPL-MCNC: 77 MG/DL — SIGNIFICANT CHANGE UP (ref 70–99)
GLUCOSE UR QL: NEGATIVE — SIGNIFICANT CHANGE UP
HCT VFR BLD CALC: 39.5 % — SIGNIFICANT CHANGE UP (ref 34.5–45)
HGB BLD-MCNC: 13.3 G/DL — SIGNIFICANT CHANGE UP (ref 11.5–15.5)
HYALINE CASTS # UR AUTO: 61 /LPF — HIGH (ref 0–2)
KETONES UR-MCNC: NEGATIVE — SIGNIFICANT CHANGE UP
LEUKOCYTE ESTERASE UR-ACNC: ABNORMAL
LYMPHOCYTES # BLD AUTO: 2.3 K/UL — SIGNIFICANT CHANGE UP (ref 1–3.3)
LYMPHOCYTES # BLD AUTO: 25.5 % — SIGNIFICANT CHANGE UP (ref 13–44)
MCHC RBC-ENTMCNC: 28.7 PG — SIGNIFICANT CHANGE UP (ref 27–34)
MCHC RBC-ENTMCNC: 33.8 GM/DL — SIGNIFICANT CHANGE UP (ref 32–36)
MCV RBC AUTO: 85.1 FL — SIGNIFICANT CHANGE UP (ref 80–100)
MONOCYTES # BLD AUTO: 0.8 K/UL — SIGNIFICANT CHANGE UP (ref 0–0.9)
MONOCYTES NFR BLD AUTO: 8.8 % — SIGNIFICANT CHANGE UP (ref 2–14)
NEUTROPHILS # BLD AUTO: 5.8 K/UL — SIGNIFICANT CHANGE UP (ref 1.8–7.4)
NEUTROPHILS NFR BLD AUTO: 64.7 % — SIGNIFICANT CHANGE UP (ref 43–77)
NITRITE UR-MCNC: NEGATIVE — SIGNIFICANT CHANGE UP
PH UR: 6 — SIGNIFICANT CHANGE UP (ref 5–8)
PLATELET # BLD AUTO: 244 K/UL — SIGNIFICANT CHANGE UP (ref 150–400)
POTASSIUM SERPL-MCNC: 4 MMOL/L — SIGNIFICANT CHANGE UP (ref 3.5–5.3)
POTASSIUM SERPL-SCNC: 4 MMOL/L — SIGNIFICANT CHANGE UP (ref 3.5–5.3)
PROT SERPL-MCNC: 7.2 G/DL — SIGNIFICANT CHANGE UP (ref 6–8.3)
PROT UR-MCNC: ABNORMAL
RBC # BLD: 4.64 M/UL — SIGNIFICANT CHANGE UP (ref 3.8–5.2)
RBC # FLD: 13.1 % — SIGNIFICANT CHANGE UP (ref 10.3–14.5)
RBC CASTS # UR COMP ASSIST: 6 /HPF — HIGH (ref 0–4)
SODIUM SERPL-SCNC: 141 MMOL/L — SIGNIFICANT CHANGE UP (ref 135–145)
SP GR SPEC: 1.04 — HIGH (ref 1.01–1.02)
TROPONIN T, HIGH SENSITIVITY RESULT: <6 NG/L — SIGNIFICANT CHANGE UP (ref 0–51)
UROBILINOGEN FLD QL: NEGATIVE — SIGNIFICANT CHANGE UP
WBC # BLD: 8.9 K/UL — SIGNIFICANT CHANGE UP (ref 3.8–10.5)
WBC # FLD AUTO: 8.9 K/UL — SIGNIFICANT CHANGE UP (ref 3.8–10.5)
WBC UR QL: 134 /HPF — HIGH (ref 0–5)

## 2018-11-19 PROCEDURE — 93010 ELECTROCARDIOGRAM REPORT: CPT

## 2018-11-19 PROCEDURE — 71045 X-RAY EXAM CHEST 1 VIEW: CPT | Mod: 26

## 2018-11-19 PROCEDURE — 99285 EMERGENCY DEPT VISIT HI MDM: CPT | Mod: 25

## 2018-11-19 RX ORDER — CEPHALEXIN 500 MG
1 CAPSULE ORAL
Qty: 14 | Refills: 0
Start: 2018-11-19 | End: 2018-11-25

## 2018-11-19 RX ORDER — CEPHALEXIN 500 MG
500 CAPSULE ORAL ONCE
Refills: 0 | Status: COMPLETED | OUTPATIENT
Start: 2018-11-19 | End: 2018-11-19

## 2018-11-19 RX ADMIN — Medication 500 MILLIGRAM(S): at 22:08

## 2018-11-19 NOTE — ED PROVIDER NOTE - ATTENDING CONTRIBUTION TO CARE
Patient presenting c/o "pinching" chest pains.  No worsening with exertion, no associated diaphoresis, shortness of breath, nausea.  Non smoker, no family history of young cardiac death.  No systemic medical issues.  On exam patient vital signs within normal limits, well appearing, RRR s1/s2, lungs clear to ascultation bilaterally, abdomen soft, non tender, non distended, strong and equal radial pulses.  EKG non ischemic, PERC negative, very low pretest probability for ACS, CXR unremarkable, patient requesting blood work/troponins which were negative, will have follow up with PMD as outpatient.

## 2018-11-19 NOTE — ED PROVIDER NOTE - CARE PLAN
Principal Discharge DX:	Chest pain  Assessment and plan of treatment:	1. You were seen for chest pain. A copy of your resulted labs, imaging, and findings have been provided to you.  2.  keflex from your pharmacy and take the medication as prescribed on the bottle's manufacterer's label, and consult a pharmacist or your primary care doctor with any questions.   3. Follow up with your primary care doctor within 48 hours. Please call [phone] to make an appointment or with any questions you may have.  4. Return immediately to the emergency department for new, persistent, or worsening symptoms or signs. Return immediately to the emergency department if you have chest pain, shortness of breath, loss of consciousness, fever, or difficulty breathing or coughing up blood.  Secondary Diagnosis:	UTI (urinary tract infection)

## 2018-11-19 NOTE — ED ADULT NURSE NOTE - OBJECTIVE STATEMENT
33y female with hx of kidney stones presents to the ER c/o left chest pain. pt is alert and oriented x 4 and speaking coherently. Pt states that earlier today she had a brief episode of pain "under her left breast" she went to urgent care and was told she needed to follow up in the ER due to a pending trip to [place]. Pt denies cp in the ER. Pt only c/o left sided neck pain. pt denies n/v/d, cp, sob. Pt is resting comfortably in stretcher. VSS. Pt on CMMD adame completed. will reassess.

## 2018-11-19 NOTE — ED PROVIDER NOTE - PLAN OF CARE
1. You were seen for chest pain. A copy of your resulted labs, imaging, and findings have been provided to you.  2.  keflex from your pharmacy and take the medication as prescribed on the bottle's manufacterer's label, and consult a pharmacist or your primary care doctor with any questions.   3. Follow up with your primary care doctor within 48 hours. Please call [phone] to make an appointment or with any questions you may have.  4. Return immediately to the emergency department for new, persistent, or worsening symptoms or signs. Return immediately to the emergency department if you have chest pain, shortness of breath, loss of consciousness, fever, or difficulty breathing or coughing up blood.

## 2018-11-19 NOTE — ED PROVIDER NOTE - OBJECTIVE STATEMENT
32 yo F c PMH of lumbar herniated disc presents with 5 hour hx of L chest pain "pinching" radiating to L arm and L neck ocurred while pt was eating resolved spontaneously except neck pain persisted with paresthesias of L hand which have resolved. Pt went to UC was given ASA 81 x 3. No hx of CP. FH of dad with MI at age 70. pt wears brace around her abdomen and back for her back pain. no ocps, no recent travel, no hx of dvt or pe.    ROS positive: CP 34 yo F c PMH of lumbar herniated disc presents with 5 hour hx of atraumatic L chest pain "pinching" radiating to L arm and L neck ocurred while pt was eating resolved spontaneously except neck pain persisted with paresthesias of L hand which have resolved. Pt went to  was given ASA 81 x 3. No hx of CP. FH of dad with MI at age 70. pt wears brace around her abdomen and back for her back pain. no ocps, no recent travel, no hx of dvt or pe.    ROS positive: CP, neck pain, L arm pain and tingling, dysuria  ROS negative: f/c, congestion, coryza, pharyngitis, SOB, hemoptysis, abdominal pain, n/v, leg edema, trauma

## 2018-11-19 NOTE — ED ADULT TRIAGE NOTE - CHIEF COMPLAINT QUOTE
Patient came to the ED c/o left sided chest pain while eating radiating to the left arm/neck and back about 1 hour PTA.  Patient said pain is a pressure and pinching.  Patient denies; N/V, shortness of breath, dizziness.  Patient went to Urgent Care prior to coming to ED.  Patient was given 3 tablets of ASA 81mg at Urgent Care.  Patient was seen for back pain at the end of October for herniated discs and has been taking Cyclobenzaprine, last dose was 2 days ago.

## 2018-11-19 NOTE — ED PROVIDER NOTE - MEDICAL DECISION MAKING DETAILS
32 yo F c PMH of lumbar herniated disc presents with 5 hour hx of atraumatic L chest pain "pinching" radiating to L arm and L neck ocurred while pt was eating resolved spontaneously except neck pain persisted with paresthesias of L hand which have resolved. On exam, VS wnl, +L paraspinous neck ttp, no focal deficits bue, EKG NSR, trop negative, +UTI seen in UA pt given and d/c'd with keflex with pmd f/u

## 2018-11-19 NOTE — ED PROVIDER NOTE - PHYSICAL EXAMINATION
neck: + L paraspinous neck ttp no overlying skin changes  BUE: compartments soft, 2+ radial pulses bilat, no skin changes, 5/5 motor strength bilat deltoids, biceps, triceps, handgrip, sensation intact to light touch C5-T1

## 2018-12-16 ENCOUNTER — TRANSCRIPTION ENCOUNTER (OUTPATIENT)
Age: 33
End: 2018-12-16

## 2019-03-18 ENCOUNTER — TRANSCRIPTION ENCOUNTER (OUTPATIENT)
Age: 34
End: 2019-03-18

## 2019-05-17 ENCOUNTER — TRANSCRIPTION ENCOUNTER (OUTPATIENT)
Age: 34
End: 2019-05-17

## 2019-09-21 ENCOUNTER — TRANSCRIPTION ENCOUNTER (OUTPATIENT)
Age: 34
End: 2019-09-21

## 2019-10-21 ENCOUNTER — TRANSCRIPTION ENCOUNTER (OUTPATIENT)
Age: 34
End: 2019-10-21

## 2020-04-25 ENCOUNTER — MESSAGE (OUTPATIENT)
Age: 35
End: 2020-04-25

## 2022-01-21 RX ORDER — METHOCARBAMOL 500 MG/1
2 TABLET, FILM COATED ORAL
Qty: 0 | Refills: 0 | DISCHARGE

## 2022-01-21 RX ORDER — IBUPROFEN 200 MG
1 TABLET ORAL
Qty: 0 | Refills: 0 | DISCHARGE

## 2022-10-27 NOTE — ED ADULT NURSE NOTE - NS ED NURSE PATIENT LEFT UNIT TIME
[FreeTextEntry1] : Last visit 4/29/22.\par \par She sometimes notices difficulty with fine motor activity later in the day.\par For instance, if she is cutting vegetables before dinner she has to be very cautious.\par \par She is using a cane as a precaution when she goes out.. She also has a bad knee.\par She has not had any falls. She tripped earlier today but was able to catch herself.\par \par She denies medication side effects.\par \par She has no major difficulties swallowing.\par \par Recently sleep is difficult. She can fall asleep but wakes up to use the bathroom and has difficulty falling back to sleep.\par She denies acting out dreams.\par \par \par \par  19:03

## 2024-03-06 ENCOUNTER — NON-APPOINTMENT (OUTPATIENT)
Age: 39
End: 2024-03-06

## 2024-04-15 ENCOUNTER — NON-APPOINTMENT (OUTPATIENT)
Age: 39
End: 2024-04-15

## 2024-04-22 PROBLEM — N23 UNSPECIFIED RENAL COLIC: Chronic | Status: ACTIVE | Noted: 2018-10-27

## 2024-05-30 ENCOUNTER — APPOINTMENT (OUTPATIENT)
Dept: OBGYN | Facility: CLINIC | Age: 39
End: 2024-05-30
Payer: COMMERCIAL

## 2024-05-30 VITALS
HEART RATE: 63 BPM | BODY MASS INDEX: 26.12 KG/M2 | DIASTOLIC BLOOD PRESSURE: 71 MMHG | HEIGHT: 64 IN | WEIGHT: 153 LBS | SYSTOLIC BLOOD PRESSURE: 105 MMHG

## 2024-05-30 DIAGNOSIS — N20.0 CALCULUS OF KIDNEY: ICD-10-CM

## 2024-05-30 DIAGNOSIS — D25.1 INTRAMURAL LEIOMYOMA OF UTERUS: ICD-10-CM

## 2024-05-30 PROCEDURE — 99204 OFFICE O/P NEW MOD 45 MIN: CPT

## 2024-06-01 NOTE — PHYSICAL EXAM
[Labia Majora] : normal [Normal] : normal [06751] : A chaperone was present during the pelvic exam. [FreeTextEntry7] : Soft, NT, ND [FreeTextEntry6] : Ut ~ 12 wks, mobile, bulky

## 2024-06-01 NOTE — DISCUSSION/SUMMARY
[FreeTextEntry1] : 40 yo P1 w/symptomatic ut myomas. Pt w/HMB and lower abd pain. Not interested in future fertility.  Images reviewed w/pt. Option discussed. Questions answered.  Plan Pt will RTO w/her  after July 15 (he is in [place]) Consider [facility] vs [facility]  Send letter to Dr. Kenya Manzano

## 2024-06-27 ENCOUNTER — APPOINTMENT (OUTPATIENT)
Dept: OBGYN | Facility: CLINIC | Age: 39
End: 2024-06-27

## 2024-09-12 ENCOUNTER — APPOINTMENT (OUTPATIENT)
Dept: OBGYN | Facility: CLINIC | Age: 39
End: 2024-09-12

## 2025-01-22 NOTE — H&P ADULT - NSHPPHYSICALEXAM_GEN_ALL_CORE
Constitutional: No Acute Distress     Neurological: AOx3, Following Commands  Pain limited exam in the BL LE.   Motor exam:          Upper extremity                         Delt     Bicep     Tricep    HG                                                 R         5/5        5/5        5/5       5/5                                               L          5/5        5/5        5/5       5/5          Lower extremity                        HF         KF        KE       DF         PF                                                  R        4+/5        5/5        5/5       5/5         5/5                                               L         4+/5        5/5       5/5       4+/5          5/5                                                 Sensation: [X] intact to light touch  [] decreased:   SLR+ and cross SLR+  No clonus or delvalle's
oral

## 2025-02-13 NOTE — ED PROVIDER NOTE - CHPI ED SYMPTOMS POS
[Alert] : alert BACK PAIN [Normal Sclera/Conjunctiva] : normal sclera/conjunctiva [Normal Outer Ear/Nose] : the ears and nose were normal in appearance [No Neck Mass] : no neck mass was observed [No Respiratory Distress] : no respiratory distress [Normal PMI] : the apical impulse was normal [Normal Bowel Sounds] : normal bowel sounds [No CVA Tenderness] : no ~M costovertebral angle tenderness [Cranial Nerves Intact] : cranial nerves 2-12 were intact [Oriented x3] : oriented to person, place, and time

## 2025-04-17 ENCOUNTER — NON-APPOINTMENT (OUTPATIENT)
Age: 40
End: 2025-04-17

## 2025-04-28 ENCOUNTER — APPOINTMENT (OUTPATIENT)
Dept: ORTHOPEDIC SURGERY | Facility: CLINIC | Age: 40
End: 2025-04-28
Payer: COMMERCIAL

## 2025-04-28 DIAGNOSIS — M54.50 LOW BACK PAIN, UNSPECIFIED: ICD-10-CM

## 2025-04-28 PROCEDURE — 72100 X-RAY EXAM L-S SPINE 2/3 VWS: CPT

## 2025-04-28 PROCEDURE — 99204 OFFICE O/P NEW MOD 45 MIN: CPT

## 2025-04-28 RX ORDER — DICLOFENAC SODIUM 75 MG/1
75 TABLET, DELAYED RELEASE ORAL
Qty: 60 | Refills: 1 | Status: ACTIVE | COMMUNITY
Start: 2025-04-28 | End: 1900-01-01

## 2025-05-07 ENCOUNTER — APPOINTMENT (OUTPATIENT)
Dept: MRI IMAGING | Facility: CLINIC | Age: 40
End: 2025-05-07
Payer: COMMERCIAL

## 2025-05-07 ENCOUNTER — TRANSCRIPTION ENCOUNTER (OUTPATIENT)
Age: 40
End: 2025-05-07

## 2025-05-07 PROCEDURE — 72148 MRI LUMBAR SPINE W/O DYE: CPT

## 2025-05-12 ENCOUNTER — APPOINTMENT (OUTPATIENT)
Dept: ORTHOPEDIC SURGERY | Facility: CLINIC | Age: 40
End: 2025-05-12
Payer: COMMERCIAL

## 2025-05-12 VITALS — WEIGHT: 153 LBS | BODY MASS INDEX: 26.12 KG/M2 | HEIGHT: 64 IN

## 2025-05-12 DIAGNOSIS — M54.50 LOW BACK PAIN, UNSPECIFIED: ICD-10-CM

## 2025-05-12 PROCEDURE — 99215 OFFICE O/P EST HI 40 MIN: CPT

## 2025-05-12 RX ORDER — DICLOFENAC SODIUM 75 MG/1
75 TABLET, DELAYED RELEASE ORAL
Qty: 40 | Refills: 0 | Status: ACTIVE | COMMUNITY
Start: 2025-05-12 | End: 1900-01-01

## 2025-05-12 RX ORDER — TIZANIDINE 2 MG/1
2 TABLET ORAL EVERY 6 HOURS
Qty: 56 | Refills: 1 | Status: ACTIVE | COMMUNITY
Start: 2025-05-12 | End: 1900-01-01

## 2025-05-21 ENCOUNTER — APPOINTMENT (OUTPATIENT)
Dept: ORTHOPEDIC SURGERY | Facility: CLINIC | Age: 40
End: 2025-05-21

## 2025-06-04 RX ORDER — DICLOFENAC SODIUM 75 MG/1
75 TABLET, DELAYED RELEASE ORAL
Qty: 30 | Refills: 0 | Status: ACTIVE | COMMUNITY
Start: 2025-06-04 | End: 1900-01-01

## 2025-06-10 RX ORDER — TIZANIDINE 2 MG/1
2 TABLET ORAL EVERY 6 HOURS
Qty: 56 | Refills: 1 | Status: ACTIVE | COMMUNITY
Start: 2025-06-10 | End: 1900-01-01

## 2025-06-16 ENCOUNTER — APPOINTMENT (OUTPATIENT)
Dept: ORTHOPEDIC SURGERY | Facility: CLINIC | Age: 40
End: 2025-06-16

## 2025-07-10 ENCOUNTER — APPOINTMENT (OUTPATIENT)
Dept: OBGYN | Facility: CLINIC | Age: 40
End: 2025-07-10
Payer: COMMERCIAL

## 2025-07-10 ENCOUNTER — NON-APPOINTMENT (OUTPATIENT)
Age: 40
End: 2025-07-10

## 2025-07-10 VITALS
SYSTOLIC BLOOD PRESSURE: 110 MMHG | WEIGHT: 155 LBS | DIASTOLIC BLOOD PRESSURE: 75 MMHG | HEIGHT: 64 IN | BODY MASS INDEX: 26.46 KG/M2

## 2025-07-10 PROBLEM — Z01.419 ENCOUNTER FOR ANNUAL ROUTINE GYNECOLOGICAL EXAMINATION: Status: ACTIVE | Noted: 2025-07-10

## 2025-07-10 PROBLEM — M51.26 HERNIATED LUMBAR INTERVERTEBRAL DISC: Status: ACTIVE | Noted: 2025-07-10

## 2025-07-10 PROBLEM — Z13.31 DEPRESSION SCREEN: Status: ACTIVE | Noted: 2025-07-10

## 2025-07-10 PROBLEM — N93.9 ABNORMAL UTERINE BLEEDING (AUB): Status: ACTIVE | Noted: 2025-07-10

## 2025-07-10 PROBLEM — Z87.42 HISTORY OF PCOS: Status: ACTIVE | Noted: 2025-07-10

## 2025-07-10 PROBLEM — N60.02 CYST OF LEFT BREAST: Status: ACTIVE | Noted: 2025-07-10

## 2025-07-10 LAB
ESTRADIOL SERPL-MCNC: 95 PG/ML
FERRITIN SERPL-MCNC: 49 NG/ML
FOLATE SERPL-MCNC: 4.6 NG/ML
FSH SERPL-MCNC: 3 IU/L
HAPTOGLOB SERPL-MCNC: 185 MG/DL
IRON SATN MFR SERPL: 20 %
IRON SERPL-MCNC: 71 UG/DL
LH SERPL-ACNC: 10.6 IU/L
T4 FREE SERPL-MCNC: 1.2 NG/DL
TIBC SERPL-MCNC: 352 UG/DL
TSH SERPL-ACNC: 1.05 UIU/ML
UIBC SERPL-MCNC: 281 UG/DL
VIT B12 SERPL-MCNC: 543 PG/ML

## 2025-07-10 PROCEDURE — 82270 OCCULT BLOOD FECES: CPT

## 2025-07-10 PROCEDURE — 99396 PREV VISIT EST AGE 40-64: CPT

## 2025-07-10 PROCEDURE — 58100 BIOPSY OF UTERUS LINING: CPT

## 2025-07-10 PROCEDURE — 36415 COLL VENOUS BLD VENIPUNCTURE: CPT

## 2025-07-10 PROCEDURE — 99214 OFFICE O/P EST MOD 30 MIN: CPT | Mod: 25

## 2025-07-11 LAB
BASOPHILS # BLD AUTO: 0.04 K/UL
BASOPHILS NFR BLD AUTO: 0.4 %
EOSINOPHIL # BLD AUTO: 0.08 K/UL
EOSINOPHIL NFR BLD AUTO: 0.7 %
HCG UR QL: NEGATIVE
HCT VFR BLD CALC: 43.3 %
HGB A MFR BLD: 97.5 %
HGB A2 MFR BLD: 2.5 %
HGB BLD-MCNC: 13.4 G/DL
HGB FRACT BLD-IMP: NORMAL
IMM GRANULOCYTES NFR BLD AUTO: 0.5 %
LYMPHOCYTES # BLD AUTO: 1.84 K/UL
LYMPHOCYTES NFR BLD AUTO: 16.5 %
MAN DIFF?: NORMAL
MCHC RBC-ENTMCNC: 28 PG
MCHC RBC-ENTMCNC: 30.9 G/DL
MCV RBC AUTO: 90.6 FL
MONOCYTES # BLD AUTO: 0.65 K/UL
MONOCYTES NFR BLD AUTO: 5.8 %
NEUTROPHILS # BLD AUTO: 8.51 K/UL
NEUTROPHILS NFR BLD AUTO: 76.1 %
PLATELET # BLD AUTO: 251 K/UL
RBC # BLD: 4.78 M/UL
RBC # BLD: 4.78 M/UL
RBC # FLD: 14.1 %
RETICS # AUTO: 1.3 %
RETICS AGGREG/RBC NFR: 63.6 K/UL
WBC # FLD AUTO: 11.18 K/UL

## 2025-07-13 LAB — HPV HIGH+LOW RISK DNA PNL CVX: NOT DETECTED

## 2025-07-16 LAB — CORE LAB BIOPSY: NORMAL

## 2025-07-23 ENCOUNTER — ASOB RESULT (OUTPATIENT)
Age: 40
End: 2025-07-23

## 2025-07-23 ENCOUNTER — APPOINTMENT (OUTPATIENT)
Dept: OBGYN | Facility: CLINIC | Age: 40
End: 2025-07-23
Payer: COMMERCIAL

## 2025-07-23 PROCEDURE — 99214 OFFICE O/P EST MOD 30 MIN: CPT | Mod: 25

## 2025-07-23 PROCEDURE — ZZZZZ: CPT

## 2025-07-23 PROCEDURE — 58340 CATHETER FOR HYSTEROGRAPHY: CPT

## 2025-07-23 PROCEDURE — 76831 ECHO EXAM UTERUS: CPT

## 2025-08-02 ENCOUNTER — APPOINTMENT (OUTPATIENT)
Dept: MRI IMAGING | Facility: CLINIC | Age: 40
End: 2025-08-02
Payer: COMMERCIAL

## 2025-08-02 PROCEDURE — 72197 MRI PELVIS W/O & W/DYE: CPT

## 2025-08-06 ENCOUNTER — ASOB RESULT (OUTPATIENT)
Age: 40
End: 2025-08-06

## 2025-08-06 ENCOUNTER — APPOINTMENT (OUTPATIENT)
Dept: OBGYN | Facility: CLINIC | Age: 40
End: 2025-08-06
Payer: COMMERCIAL

## 2025-08-06 VITALS — SYSTOLIC BLOOD PRESSURE: 116 MMHG | DIASTOLIC BLOOD PRESSURE: 73 MMHG

## 2025-08-06 DIAGNOSIS — D25.1 INTRAMURAL LEIOMYOMA OF UTERUS: ICD-10-CM

## 2025-08-06 DIAGNOSIS — N84.0 POLYP OF CORPUS UTERI: ICD-10-CM

## 2025-08-06 DIAGNOSIS — Z97.5 PRESENCE OF (INTRAUTERINE) CONTRACEPTIVE DEVICE: ICD-10-CM

## 2025-08-06 PROCEDURE — 99214 OFFICE O/P EST MOD 30 MIN: CPT

## 2025-08-06 PROCEDURE — 76830 TRANSVAGINAL US NON-OB: CPT

## 2025-08-06 RX ORDER — MISOPROSTOL 200 UG/1
200 TABLET ORAL
Qty: 2 | Refills: 0 | Status: ACTIVE | COMMUNITY
Start: 2025-08-06 | End: 1900-01-01

## 2025-08-06 RX ORDER — ELAGOLIX 150 MG/1
150 TABLET, FILM COATED ORAL
Qty: 90 | Refills: 3 | Status: ACTIVE | COMMUNITY
Start: 2025-08-06 | End: 1900-01-01

## 2025-08-21 ENCOUNTER — APPOINTMENT (OUTPATIENT)
Dept: OBGYN | Facility: CLINIC | Age: 40
End: 2025-08-21